# Patient Record
Sex: FEMALE | Race: BLACK OR AFRICAN AMERICAN | Employment: FULL TIME | URBAN - METROPOLITAN AREA
[De-identification: names, ages, dates, MRNs, and addresses within clinical notes are randomized per-mention and may not be internally consistent; named-entity substitution may affect disease eponyms.]

---

## 2017-03-21 ENCOUNTER — OFFICE VISIT (OUTPATIENT)
Dept: FAMILY MEDICINE CLINIC | Age: 32
End: 2017-03-21

## 2017-03-21 VITALS
BODY MASS INDEX: 29.57 KG/M2 | RESPIRATION RATE: 16 BRPM | DIASTOLIC BLOOD PRESSURE: 84 MMHG | TEMPERATURE: 97.9 F | OXYGEN SATURATION: 100 % | WEIGHT: 184 LBS | HEIGHT: 66 IN | SYSTOLIC BLOOD PRESSURE: 128 MMHG | HEART RATE: 61 BPM

## 2017-03-21 DIAGNOSIS — N97.9 INFERTILITY, FEMALE: ICD-10-CM

## 2017-03-21 DIAGNOSIS — Z13.220 SCREENING FOR CHOLESTEROL LEVEL: ICD-10-CM

## 2017-03-21 DIAGNOSIS — R21 RASH: ICD-10-CM

## 2017-03-21 DIAGNOSIS — Z80.9 FAMILY HISTORY OF CANCER: Primary | ICD-10-CM

## 2017-03-21 RX ORDER — CLOCORTOLONE PIVALATE 0 G/G
CREAM TOPICAL 3 TIMES DAILY
Qty: 30 G | Refills: 0 | Status: SHIPPED | OUTPATIENT
Start: 2017-03-21 | End: 2017-03-28 | Stop reason: CLARIF

## 2017-03-21 NOTE — PATIENT INSTRUCTIONS
Infertility: Care Instructions  Your Care Instructions  Infertility means that you have not been able to get pregnant after trying for at least 1 year. It does not mean you will never get pregnant. A woman's chances of getting pregnant are higher when she is younger. A woman is most able to get pregnant (fertile) in her late 25s. Then, in her mid-30s, she becomes less fertile. This is because her eggs get older. If you are younger than 28, you may want to give yourself more time to get pregnant. If you are 28 or older, you may want to start treatment. It can help to learn more about when you have the best chance of getting pregnant. For most women, there are five days a month when they are most likely to get pregnant. This is the time when an egg is released. This is called ovulation. Ovulation usually happens 12 to 16 days before your next period begins. You can figure out when you ovulate if you write down for a few months when you start and end your periods. Then you can count how many days are between the first day of your periods. This amount of time is called your cycle. The average cycle is 28 days. But some women have cycles that are a little shorter or longer. After you know how long your cycle is, you can predict when your next period will start. And then, you can count backward from that day to know when you will ovulate next. Your doctor may also suggest a home ovulation test. This test can tell you when you are ovulating. Infertility can be caused by a problem with the reproductive organs of a woman, a man, or both. Your doctor can help you find out what kind of problem you may have. It's important to talk about testing and treatment choices with your doctor. If you choose to do some tests, you will probably start with a hormone test. This is a test for both of you. And then the man will probably have a semen test.  Follow-up care is a key part of your treatment and safety.  Be sure to make and go to all appointments, and call your doctor if you are having problems. It's also a good idea to know your test results and keep a list of the medicines you take. How can you care for yourself at home? For women  · Take a multivitamin with folic acid. This helps to prevent birth defects if you do become pregnant. · Get regular exercise. But do not overdo it. Really hard and long exercise can cause you to release an egg less often. · Eat healthy foods. And drink lots of water. · Stay at a healthy weight. This will increase your chances of getting pregnant. Women who weigh too much or too little can be less fertile. · Talk to your doctor about all medicines you are taking or may take. This includes over-the-counter and prescribed medicines and herbal remedies. Some medicines interfere with pregnancy. · Write down when your period starts and stops for a few months. Bring that information to your doctor. He or she can help you figure out when you ovulate and are most likely to get pregnant if you have sex. Or you may prefer to use a home ovulation test.  For men  · Avoid hot tubs and saunas. · If you get sick and have a fever, try to control your fever. A high fever may reduce your sperm count for months. · If you exercise very hard most days of the week, reduce how much exercise you do. Hard, long exercise may lower your sperm count. · Eat a healthy diet and stay at a healthy weight. Limit alcohol to 2 drinks a day. For both men and women  · If the woman knows when she will ovulate, try to have sex once a day for the 4 days before ovulation and on the day of ovulation. If the man has a low sperm count, have sex every other day. · If the woman does not know when she will ovulate, have sex 2 or 3 times each week. · Don't use lubricants during sex. They may affect how well sperm can travel to meet the woman's egg. · Avoid smoking and illegal drugs. When should you call for help?   Watch closely for changes in your health, and be sure to contact your doctor if:  · You want to try other treatments for infertility. Where can you learn more? Go to http://avelino-stu.info/. Enter 042 5204 in the search box to learn more about \"Infertility: Care Instructions. \"  Current as of: May 30, 2016  Content Version: 11.1  © 5428-9731 [employer]. Care instructions adapted under license by [employer] (which disclaims liability or warranty for this information). If you have questions about a medical condition or this instruction, always ask your healthcare professional. [place] [address] any warranty or liability for your use of this information.

## 2017-03-21 NOTE — PROGRESS NOTES
Daniela Kennedy is here today as a new patient to establish care with provider. 1. Have you been to the ER, urgent care clinic since your last visit? Hospitalized since your last visit? No    2. Have you seen or consulted any other health care providers outside of the [address] [place] [address] since your last visit? Include any pap smears or colon screening.  No

## 2017-03-21 NOTE — PROGRESS NOTES
Chief Complaint   Patient presents with   [facility] Patient   [address]       Pt is a 32y.o. year old female who presents for follow up of her chronic medical problems    4 kids  Has not seen a doc in a while    Family History   Problem Relation Age of Onset    Diabetes Mother     No Known Problems Father    [address] with breast cancer in her 29's    Grandmother also  of lung cancer  Several aunts also  of different kinds of cancer  Cousin with sickle cell anemia    Cautious about her health- \"I am a health guru\"    ? Hives on her back 2 days ago-itchy, just there now  Daughter had a break out prior to that on her neck    1 year of trying to have a baby with freddy' but no success; even did the ovulation kit  Menses regular  Freddy' got tested  Miscarriage a few months ago at 2 months-she says it came out when she went to urinate      ROS:    Pt denies: Wt loss, Fever/Chills, HA, Visual changes, Fatigue, Chest pain, SOB, CANSECO, Abd pain, N/V/D/C, Blood in stool or urine, Edema. Pertinent positive as above in HPI. All others were negative    There is no problem list on file for this patient. History reviewed. No pertinent past medical history. Current Outpatient Prescriptions   Medication Sig Dispense Refill    prenatal multivit-ca-min-fe-fa tab Take  by mouth. History   Smoking Status    Current Every Day Smoker    Packs/day: 0.25    Years: 11.00   Smokeless Tobacco    Never Used       No Known Allergies    Patient Labs were reviewed: yes      Patient Past Records were reviewed:  yes        Objective:     Vitals:    17 1033   BP: 128/84   Pulse: 61   Resp: 16   Temp: 97.9 °F (36.6 °C)   TempSrc: Oral   SpO2: 100%   Weight: 184 lb (83.5 kg)   Height: 5' 6\" (1.676 m)     Body mass index is 29.7 kg/(m^2). Exam:   Appearance: alert, well appearing,  oriented to person, place, and time, acyanotic, in no respiratory distress and well hydrated.   HEENT:  NC/AT, pink conj, anicteric sclerae  Neck:  No cervical lymphadenopathy, no JVD, no thyromegaly, no carotid bruit  Heart:  RRR without M/R/G  Lungs:  CTAB, no rhonchi, rales, or wheezes with good air exchange   Abdomen:  Non-tender, pos bowel sounds, no hepatosplenomegaly  Ext:  No C/C/E    Skin: scaly rash noted on the back which appeared more like dry patches  Neuro: no lateralizing signs, CNs II-XII intact      Assessment/ Plan:   Angela Tariq was seen today for new patient and establish care. Diagnoses and all orders for this visit:    Family history of cancer-advised to gather all info regarding cancer in her family prior to appt with genetic counsellor  -     REFERRAL TO GENETICS    Infertility, female  -     REFERRAL TO GYNECOLOGY  -     METABOLIC PANEL, COMPREHENSIVE; Future  -     TSH 3RD GENERATION; Future    Rash-likely eczema, advised to moisturize the area, avoid hot showers and scented soap  -     CBC WITH AUTOMATED DIFF; Future  -     METABOLIC PANEL, COMPREHENSIVE; Future  -     clocortolone pivalate (CLODERM) 0.1 % topical cream; Apply  to affected area three (3) times daily. Screening for cholesterol level  -     LIPID PANEL; Future      Follow-up Disposition:  Return if symptoms worsen or fail to improve. I have discussed the diagnosis with the patient and the intended plan as seen in the above orders. The patient has received an After-Visit Summary and questions were answered concerning future plans. Medication Side Effects and Warnings were discussed with patient: yes    Patient verbalized understanding of above instructions.     Arianna Apodaca MD  Internal Medicine  [facility]

## 2017-03-21 NOTE — MR AVS SNAPSHOT
Visit Information Date & Time Provider Department Dept. Phone Encounter #  
 3/21/2017 10:00 AM Collette Boop, MD [facility] [id number] Upcoming Health Maintenance Date Due DTaP/Tdap/Td series (1 - Tdap) 12/8/2006 PAP AKA CERVICAL CYTOLOGY 12/8/2006 INFLUENZA AGE 9 TO ADULT 8/1/2016 Allergies as of 3/21/2017  Review Complete On: 3/21/2017 By: Collette Boop, MD  
 No Known Allergies Current Immunizations  Never Reviewed No immunizations on file. Not reviewed this visit You Were Diagnosed With   
  
 Codes Comments Family history of cancer    -  Primary ICD-10-CM: Z80.9 ICD-9-CM: V16.9 Infertility, female     ICD-10-CM: N97.9 ICD-9-CM: 628.9 Rash     ICD-10-CM: R21 
ICD-9-CM: 782.1 Screening for cholesterol level     ICD-10-CM: Z13.220 ICD-9-CM: V77.91 Vitals BP Pulse Temp Resp Height(growth percentile) Weight(growth percentile) 128/84 61 97.9 °F (36.6 °C) (Oral) 16 5' 6\" (1.676 m) 184 lb (83.5 kg) LMP SpO2 BMI OB Status Smoking Status 02/21/2017 (Within Days) 100% 29.7 kg/m2 Having regular periods Current Every Day Smoker Vitals History BMI and BSA Data Body Mass Index Body Surface Area  
 29.7 kg/m 2 1.97 m 2 Preferred Pharmacy Pharmacy Name Phone [facility], [address] [place] [address] [address] [phone] Your Updated Medication List  
  
   
This list is accurate as of: 3/21/17 11:29 AM.  Always use your most recent med list.  
  
  
  
  
 clocortolone pivalate 0.1 % topical cream  
Commonly known as:  CLODERM Apply  to affected area three (3) times daily. prenatal multivit-ca-min-fe-fa Tab Take  by mouth. Prescriptions Sent to Pharmacy Refills  
 clocortolone pivalate (CLODERM) 0.1 % topical cream 0 Sig: Apply  to affected area three (3) times daily. Class: Normal  
 Pharmacy: [facility], [address] [facility] [address]  #: [phone] Route: Topical  
  
We Performed the Following REFERRAL TO GENETICS [SME82 Custom] Comments:  
 Sister with breast cancer in her 29's, several relatives also with cancer REFERRAL TO GYNECOLOGY [REF30 Custom] Comments:  
 Please evaluate patient for infertility-Dr. Vikki Jauregui here To-Do List   
 03/21/2017 Lab:  CBC WITH AUTOMATED DIFF   
  
 03/21/2017 Lab:  LIPID PANEL   
  
 03/21/2017 Lab:  METABOLIC PANEL, COMPREHENSIVE   
  
 03/21/2017 Lab:  TSH 3RD GENERATION Referral Information Referral ID Referred By Referred To  
  
 [id number] Oddis Showers Not Available Visits Status Start Date End Date 1 New Request 3/21/17 3/21/18 If your referral has a status of pending review or denied, additional information will be sent to support the outcome of this decision. Referral ID Referred By Referred To  
 [id number] Oddis Showers Not Available Visits Status Start Date End Date 1 New Request 3/21/17 3/21/18 If your referral has a status of pending review or denied, additional information will be sent to support the outcome of this decision. Patient Instructions Infertility: Care Instructions Your Care Instructions Infertility means that you have not been able to get pregnant after trying for at least 1 year. It does not mean you will never get pregnant. A woman's chances of getting pregnant are higher when she is younger. A woman is most able to get pregnant (fertile) in her late 25s. Then, in her mid-30s, she becomes less fertile. This is because her eggs get older. If you are younger than 28, you may want to give yourself more time to get pregnant. If you are 28 or older, you may want to start treatment. It can help to learn more about when you have the best chance of getting pregnant. For most women, there are five days a month when they are most likely to get pregnant. This is the time when an egg is released. This is called ovulation. Ovulation usually happens 12 to 16 days before your next period begins. You can figure out when you ovulate if you write down for a few months when you start and end your periods. Then you can count how many days are between the first day of your periods. This amount of time is called your cycle. The average cycle is 28 days. But some women have cycles that are a little shorter or longer. After you know how long your cycle is, you can predict when your next period will start. And then, you can count backward from that day to know when you will ovulate next. Your doctor may also suggest a home ovulation test. This test can tell you when you are ovulating. Infertility can be caused by a problem with the reproductive organs of a woman, a man, or both. Your doctor can help you find out what kind of problem you may have. It's important to talk about testing and treatment choices with your doctor. If you choose to do some tests, you will probably start with a hormone test. This is a test for both of you. And then the man will probably have a semen test. 
Follow-up care is a key part of your treatment and safety. Be sure to make and go to all appointments, and call your doctor if you are having problems. It's also a good idea to know your test results and keep a list of the medicines you take. How can you care for yourself at home? For women · Take a multivitamin with folic acid. This helps to prevent birth defects if you do become pregnant. · Get regular exercise. But do not overdo it. Really hard and long exercise can cause you to release an egg less often. · Eat healthy foods. And drink lots of water. · Stay at a healthy weight.  This will increase your chances of getting pregnant. Women who weigh too much or too little can be less fertile. · Talk to your doctor about all medicines you are taking or may take. This includes over-the-counter and prescribed medicines and herbal remedies. Some medicines interfere with pregnancy. · Write down when your period starts and stops for a few months. Bring that information to your doctor. He or she can help you figure out when you ovulate and are most likely to get pregnant if you have sex. Or you may prefer to use a home ovulation test. 
For men · Avoid hot tubs and saunas. · If you get sick and have a fever, try to control your fever. A high fever may reduce your sperm count for months. · If you exercise very hard most days of the week, reduce how much exercise you do. Hard, long exercise may lower your sperm count. · Eat a healthy diet and stay at a healthy weight. Limit alcohol to 2 drinks a day. For both men and women · If the woman knows when she will ovulate, try to have sex once a day for the 4 days before ovulation and on the day of ovulation. If the man has a low sperm count, have sex every other day. · If the woman does not know when she will ovulate, have sex 2 or 3 times each week. · Don't use lubricants during sex. They may affect how well sperm can travel to meet the woman's egg. · Avoid smoking and illegal drugs. When should you call for help? Watch closely for changes in your health, and be sure to contact your doctor if: 
· You want to try other treatments for infertility. Where can you learn more? Go to http://avelino-stu.info/. Enter 542 4342 in the search box to learn more about \"Infertility: Care Instructions. \" Current as of: May 30, 2016 Content Version: 11.1 © 2607-8106 [employer]. Care instructions adapted under license by [employer] (which disclaims liability or warranty for this information).  If you have questions about a medical condition or this instruction, always ask your healthcare professional. [place] [address] any warranty or liability for your use of this information. Introducing [facility]! Alex Winters introduces Jaypore patient portal. Now you can access parts of your medical record, email your doctor's office, and request medication refills online. 1. In your internet browser, go to [email or web address] 2. Click on the First Time User? Click Here link in the Sign In box. You will see the New Member Sign Up page. 3. Enter your Jaypore Access Code exactly as it appears below. You will not need to use this code after youve completed the sign-up process. If you do not sign up before the expiration date, you must request a new code. · Jaypore Access Code: [email or web address] Expires: 6/19/2017 11:29 AM 
 
4. Enter the last four digits of your Social Security Number (xxxx) and Date of Birth (mm/dd/yyyy) as indicated and click Submit. You will be taken to the next sign-up page. 5. Create a Jaypore ID. This will be your Jaypore login ID and cannot be changed, so think of one that is secure and easy to remember. 6. Create a Jaypore password. You can change your password at any time. 7. Enter your Password Reset Question and Answer. This can be used at a later time if you forget your password. 8. Enter your e-mail address. You will receive e-mail notification when new information is available in [address] 9. Click Sign Up. You can now view and download portions of your medical record. 10. Click the Download Summary menu link to download a portable copy of your medical information. If you have questions, please visit the Frequently Asked Questions section of the Jaypore website. Remember, Jaypore is NOT to be used for urgent needs. For medical emergencies, dial 911. Now available from your iPhone and Android! Please provide this summary of care documentation to your next provider. Your primary care clinician is listed as Amy Garcia. If you have any questions after today's visit, please call [phone].

## 2017-03-22 LAB
ALBUMIN SERPL-MCNC: 4.4 G/DL (ref 3.5–5.5)
ALBUMIN/GLOB SERPL: 1.4 {RATIO} (ref 1.2–2.2)
ALP SERPL-CCNC: 49 IU/L (ref 39–117)
ALT SERPL-CCNC: 11 IU/L (ref 0–32)
AST SERPL-CCNC: 15 IU/L (ref 0–40)
BASOPHILS # BLD AUTO: 0.1 X10E3/UL (ref 0–0.2)
BASOPHILS NFR BLD AUTO: 1 %
BILIRUB SERPL-MCNC: 0.6 MG/DL (ref 0–1.2)
BUN SERPL-MCNC: 10 MG/DL (ref 6–20)
BUN/CREAT SERPL: 13 (ref 8–20)
CALCIUM SERPL-MCNC: 9.5 MG/DL (ref 8.7–10.2)
CHLORIDE SERPL-SCNC: 104 MMOL/L (ref 96–106)
CHOLEST SERPL-MCNC: 179 MG/DL (ref 100–199)
CO2 SERPL-SCNC: 21 MMOL/L (ref 18–29)
CREAT SERPL-MCNC: 0.76 MG/DL (ref 0.57–1)
EOSINOPHIL # BLD AUTO: 0.1 X10E3/UL (ref 0–0.4)
EOSINOPHIL NFR BLD AUTO: 2 %
ERYTHROCYTE [DISTWIDTH] IN BLOOD BY AUTOMATED COUNT: 14 % (ref 12.3–15.4)
GLOBULIN SER CALC-MCNC: 3.1 G/DL (ref 1.5–4.5)
GLUCOSE SERPL-MCNC: 88 MG/DL (ref 65–99)
HCT VFR BLD AUTO: 40.2 % (ref 34–46.6)
HDLC SERPL-MCNC: 75 MG/DL
HGB BLD-MCNC: 13 G/DL (ref 11.1–15.9)
IMM GRANULOCYTES # BLD: 0 X10E3/UL (ref 0–0.1)
IMM GRANULOCYTES NFR BLD: 0 %
INTERPRETATION, 910389: NORMAL
LDLC SERPL CALC-MCNC: 89 MG/DL (ref 0–99)
LYMPHOCYTES # BLD AUTO: 2.6 X10E3/UL (ref 0.7–3.1)
LYMPHOCYTES NFR BLD AUTO: 48 %
MCH RBC QN AUTO: 28.3 PG (ref 26.6–33)
MCHC RBC AUTO-ENTMCNC: 32.3 G/DL (ref 31.5–35.7)
MCV RBC AUTO: 88 FL (ref 79–97)
MONOCYTES # BLD AUTO: 0.3 X10E3/UL (ref 0.1–0.9)
MONOCYTES NFR BLD AUTO: 6 %
NEUTROPHILS # BLD AUTO: 2.3 X10E3/UL (ref 1.4–7)
NEUTROPHILS NFR BLD AUTO: 43 %
PLATELET # BLD AUTO: 315 X10E3/UL (ref 150–379)
POTASSIUM SERPL-SCNC: 4.7 MMOL/L (ref 3.5–5.2)
PROT SERPL-MCNC: 7.5 G/DL (ref 6–8.5)
RBC # BLD AUTO: 4.59 X10E6/UL (ref 3.77–5.28)
SODIUM SERPL-SCNC: 137 MMOL/L (ref 134–144)
TRIGL SERPL-MCNC: 75 MG/DL (ref 0–149)
TSH SERPL DL<=0.005 MIU/L-ACNC: 1.18 UIU/ML (ref 0.45–4.5)
VLDLC SERPL CALC-MCNC: 15 MG/DL (ref 5–40)
WBC # BLD AUTO: 5.4 X10E3/UL (ref 3.4–10.8)

## 2017-03-28 DIAGNOSIS — R21 RASH: Primary | ICD-10-CM

## 2017-03-28 RX ORDER — CLOBETASOL PROPIONATE 0.5 MG/G
CREAM TOPICAL 2 TIMES DAILY
Qty: 30 G | Refills: 0 | Status: SHIPPED | OUTPATIENT
Start: 2017-03-28 | End: 2018-04-20

## 2018-04-06 ENCOUNTER — OFFICE VISIT (OUTPATIENT)
Dept: FAMILY MEDICINE CLINIC | Age: 33
End: 2018-04-06

## 2018-04-06 VITALS
HEART RATE: 72 BPM | HEIGHT: 66 IN | DIASTOLIC BLOOD PRESSURE: 90 MMHG | OXYGEN SATURATION: 100 % | RESPIRATION RATE: 18 BRPM | BODY MASS INDEX: 27.71 KG/M2 | SYSTOLIC BLOOD PRESSURE: 137 MMHG | TEMPERATURE: 98.4 F | WEIGHT: 172.4 LBS

## 2018-04-06 DIAGNOSIS — N97.9 INFERTILITY, FEMALE: Primary | ICD-10-CM

## 2018-04-06 DIAGNOSIS — G43.109 MIGRAINE WITH AURA AND WITHOUT STATUS MIGRAINOSUS, NOT INTRACTABLE: ICD-10-CM

## 2018-04-06 DIAGNOSIS — F43.10 PTSD (POST-TRAUMATIC STRESS DISORDER): ICD-10-CM

## 2018-04-06 DIAGNOSIS — F51.01 PRIMARY INSOMNIA: ICD-10-CM

## 2018-04-06 NOTE — MR AVS SNAPSHOT
eDon Jay Lima [address]79 68 [facility] Yuri. [address] [facility] 83 [id number] 
[phone] Patient: Dom Salvador MRN: [id number] :1985 Visit Information Date & Time Provider Department Dept. Phone Encounter #  
 2018 11:00 AM Brayn Corrigan, [address] [facility] [phone] [id number] Follow-up Instructions Return if symptoms worsen or fail to improve. Upcoming Health Maintenance Date Due Pneumococcal 19-64 Medium Risk (1 of 1 - PPSV23) 2004 DTaP/Tdap/Td series (1 - Tdap) 2006 PAP AKA CERVICAL CYTOLOGY 2006 Influenza Age 5 to Adult 2017 Allergies as of 2018  Review Complete On: 2018 By: Joe Becerra LPN No Known Allergies Current Immunizations  Never Reviewed No immunizations on file. Not reviewed this visit You Were Diagnosed With   
  
 Codes Comments Infertility, female    -  Primary ICD-10-CM: N97.9 ICD-9-CM: 628.9 PTSD (post-traumatic stress disorder)     ICD-10-CM: F43.10 ICD-9-CM: 309.81 Primary insomnia     ICD-10-CM: F51.01 
ICD-9-CM: 307.42 Migraine with aura and without status migrainosus, not intractable     ICD-10-CM: G43.109 ICD-9-CM: 346.00 Vitals BP Pulse Temp Resp Height(growth percentile) Weight(growth percentile) 137/90 (BP 1 Location: Left arm, BP Patient Position: Sitting) 72 98.4 °F (36.9 °C) 18 5' 6\" (1.676 m) 172 lb 6.4 oz (78.2 kg) LMP SpO2 BMI OB Status Smoking Status 2018 (Approximate) 100% 27.83 kg/m2 Having regular periods Current Every Day Smoker Vitals History BMI and BSA Data Body Mass Index Body Surface Area  
 27.83 kg/m 2 1.91 m 2 Preferred Pharmacy Pharmacy Name Phone [facility], [address] [place] [address] [address] [facility] [phone] Your Updated Medication List  
  
   
 This list is accurate as of 4/6/18 12:00 PM.  Always use your most recent med list.  
  
  
  
  
 clobetasol 0.05 % topical cream  
Commonly known as:  Alonna Gear Apply  to affected area two (2) times a day. CREATINE PO Take  by mouth. MOTRIN PM PO Take  by mouth.  
  
 prenatal multivit-ca-min-fe-fa Tab Take  by mouth. We Performed the Following REFERRAL TO INFERTILITY [BXX20 Custom] Comments:  
 Been trying for over a year, miscarriage 2 years ago,  test he is fine REFERRAL TO NEUROLOGY [QDD09 Custom] Comments:  
 Migraine headaches Follow-up Instructions Return if symptoms worsen or fail to improve. Referral Information Referral ID Referred By Referred To  
  
 [id number] Jyoti GROVE 99 Not Available Visits Status Start Date End Date 1 New Request 4/6/18 4/6/19 If your referral has a status of pending review or denied, additional information will be sent to support the outcome of this decision. Referral ID Referred By Referred To  
 [id number] Jyoti GROVE 99 Not Available Visits Status Start Date End Date 1 New Request 4/6/18 4/6/19 If your referral has a status of pending review or denied, additional information will be sent to support the outcome of this decision. Patient Instructions Infertility: Care Instructions Your Care Instructions Infertility means that you have not been able to get pregnant after trying for at least 1 year. It does not mean you will never get pregnant. A woman's chances of getting pregnant are higher when she is younger. A woman is most able to get pregnant (fertile) in her late 25s. Then, in her mid-30s, she becomes less fertile. This is because her eggs get older. If you are younger than 28, you may want to give yourself more time to get pregnant. If you are 28 or older, you may want to start treatment. It can help to learn more about when you have the best chance of getting pregnant. For most women, there are five days a month when they are most likely to get pregnant. This is the time when an egg is released. This is called ovulation. Ovulation usually happens 12 to 16 days before your next period begins. You can figure out when you ovulate if you write down for a few months when you start and end your periods. Then you can count how many days are between the first day of your periods. This amount of time is called your cycle. The average cycle is 28 days. But some women have cycles that are a little shorter or longer. After you know how long your cycle is, you can predict when your next period will start. And then, you can count backward from that day to know when you will ovulate next. Your doctor may also suggest a home ovulation test. This test can tell you when you are ovulating. Infertility can be caused by a problem with the reproductive organs of a woman, a man, or both. Your doctor can help you find out what kind of problem you may have. It's important to talk about testing and treatment choices with your doctor. If you choose to do some tests, you will probably start with a hormone test. This is a test for both of you. And then the man will probably have a semen test. 
Follow-up care is a key part of your treatment and safety. Be sure to make and go to all appointments, and call your doctor if you are having problems. It's also a good idea to know your test results and keep a list of the medicines you take. How can you care for yourself at home? For women · Take a multivitamin with folic acid. This helps to prevent birth defects if you do become pregnant. · Get regular exercise. But do not overdo it. Really hard and long exercise can cause you to release an egg less often. · Eat healthy foods. And drink lots of water. · Stay at a healthy weight.  This will increase your chances of getting pregnant. Women who weigh too much or too little can be less fertile. · Talk to your doctor about all medicines you are taking or may take. This includes over-the-counter and prescribed medicines and herbal remedies. Some medicines interfere with pregnancy. · Write down when your period starts and stops for a few months. Bring that information to your doctor. He or she can help you figure out when you ovulate and are most likely to get pregnant if you have sex. Or you may prefer to use a home ovulation test. 
For men · Avoid hot tubs and saunas. · If you get sick and have a fever, try to control your fever. A high fever may reduce your sperm count for months. · If you exercise very hard most days of the week, reduce how much exercise you do. Hard, long exercise may lower your sperm count. · Eat a healthy diet and stay at a healthy weight. Limit alcohol to 2 drinks a day. For both men and women · If the woman knows when she will ovulate, try to have sex once a day for the 4 days before ovulation and on the day of ovulation. If the man has a low sperm count, have sex every other day. · If the woman does not know when she will ovulate, have sex 2 or 3 times each week. · Don't use lubricants during sex. They may affect how well sperm can travel to meet the woman's egg. · Avoid smoking and illegal drugs. When should you call for help? Watch closely for changes in your health, and be sure to contact your doctor if you have any problems. Where can you learn more? Go to http://avelino-stu.info/. Enter 764 [id number] in the search box to learn more about \"Infertility: Care Instructions. \" Current as of: March 16, 2017 Content Version: 11.4 © 8721-1382 [employer]Data. Care instructions adapted under license by [employer] (which disclaims liability or warranty for this information).  If you have questions about a medical condition or this instruction, always ask your healthcare professional. [address] any warranty or liability for your use of this information. Insomnia: Care Instructions Your Care Instructions Insomnia is the inability to sleep well. It is a common problem for most people at some time. Insomnia may make it hard for you to get to sleep, stay asleep, or sleep as long as you need to. This can make you tired and grouchy during the day. It can also make you forgetful, less effective at work, and unhappy. Insomnia can be caused by conditions such as depression or anxiety. Pain can also affect your ability to sleep. When these problems are solved, the insomnia usually clears up. But sometimes bad sleep habits can cause insomnia. If insomnia is affecting your work or your enjoyment of life, you can take steps to improve your sleep. Follow-up care is a key part of your treatment and safety. Be sure to make and go to all appointments, and call your doctor if you are having problems. It's also a good idea to know your test results and keep a list of the medicines you take. How can you care for yourself at home? What to avoid · Do not have drinks with caffeine, such as coffee or black tea, for 8 hours before bed. · Do not smoke or use other types of tobacco near bedtime. Nicotine is a stimulant and can keep you awake. · Avoid drinking alcohol late in the evening, because it can cause you to wake in the middle of the night. · Do not eat a big meal close to bedtime. If you are hungry, eat a light snack. · Do not drink a lot of water close to bedtime, because the need to urinate may wake you up during the night. · Do not read or watch TV in bed. Use the bed only for sleeping and sexual activity. What to try · Go to bed at the same time every night, and wake up at the same time every morning. Do not take naps during the day. · Keep your bedroom quiet, dark, and cool. · Sleep on a comfortable pillow and mattress. · If watching the clock makes you anxious, turn it facing away from you so you cannot see the time. · If you worry when you lie down, start a worry book. Well before bedtime, write down your worries, and then set the book and your concerns aside. · Try meditation or other relaxation techniques before you go to bed. · If you cannot fall asleep, get up and go to another room until you feel sleepy. Do something relaxing. Repeat your bedtime routine before you go to bed again. · Make your house quiet and calm about an hour before bedtime. Turn down the lights, turn off the TV, log off the computer, and turn down the volume on music. This can help you relax after a busy day. When should you call for help? Watch closely for changes in your health, and be sure to contact your doctor if: 
? · Your efforts to improve your sleep do not work. ? · Your insomnia gets worse. ? · You have been feeling down, depressed, or hopeless or have lost interest in things that you usually enjoy. Where can you learn more? Go to http://avelinoWine in Blackstu.info/. Enter P513 in the search box to learn more about \"Insomnia: Care Instructions. \" Current as of: July 26, 2016 Content Version: 11.4 © 3632-9590 [employer]. Care instructions adapted under license by [employer] (which disclaims liability or warranty for this information). If you have questions about a medical condition or this instruction, always ask your healthcare professional. [place] [address] any warranty or liability for your use of this information. Deciding About Taking Medicine to Prevent Migraines Deciding About Taking Medicine to Prevent Migraines What are migraines? Migraines are painful, throbbing headaches. They can last from 4 to 72 hours. They often occur on only one side of your head.  But you may feel them on both sides. The pain may keep you from doing your daily activities. You may take a daily medicine if you get bad migraines often. This can help prevent them. What are key points about this decision? · Medicines to prevent migraines may not stop them every time. But if you take them daily, you can reduce how many migraines you get by more than half. They can also reduce how long migraines last. And your symptoms may not be as bad. · Medicines that prevent migraines may cause side effects. You may have sleep and memory problems, upset stomach, dry mouth, or constipation. Some of these side effects may last for as long as you take the medicine. Or they may go away within a few weeks. Why might you choose to take medicine to prevent migraines? · You are willing to take medicine daily if it will help your symptoms. · You don't think the side effects of the medicine could be as bad as your migraine symptoms. · Your migraines get in the way of your work. Or they harm your relationships with friends and family. · Benefits of medicine include fewer or no migraines. And your migraines may not last as long or feel as bad. Why might you choose not to take medicine to prevent migraines? · You want to avoid the side effects of the medicine. · You don't want to take medicine every day. · Your migraines are not affecting your work and relationships. · If your symptoms don't improve with home treatment and other medicines, you can decide later to take medicine every day to help prevent migraines. Your decision Thinking about the facts and your feelings can help you make a decision that is right for you. Be sure you understand the benefits and risks of your options, and think about what else you need to do before you make the decision. Where can you learn more? Go to http://avelino-stu.info/.  
Enter K893 in the search box to learn more about \"Deciding About Taking Medicine to Prevent Migraines. \" Current as of: October 14, 2016 Content Version: 11.4 © 4927-0250 [employer]ParcelDelivery. Care instructions adapted under license by [employer] (which disclaims liability or warranty for this information). If you have questions about a medical condition or this instruction, always ask your healthcare professional. [address]  any warranty or liability for your use of this information. Introducing [facility]! [facility] introduces KneoWorld patient portal. Now you can access parts of your medical record, email your doctor's office, and request medication refills online. 1. In your internet browser, go to [email or web address] 2. Click on the First Time User? Click Here link in the Sign In box. You will see the New Member Sign Up page. 3. Enter your KneoWorld Access Code exactly as it appears below. You will not need to use this code after youve completed the sign-up process. If you do not sign up before the expiration date, you must request a new code. · KneoWorld Access Code: [email or web address] Expires: 4/22/2018  2:34 PM 
 
4. Enter the last four digits of your Social Security Number (xxxx) and Date of Birth (mm/dd/yyyy) as indicated and click Submit. You will be taken to the next sign-up page. 5. Create a KneoWorld ID. This will be your KneoWorld login ID and cannot be changed, so think of one that is secure and easy to remember. 6. Create a KneoWorld password. You can change your password at any time. 7. Enter your Password Reset Question and Answer. This can be used at a later time if you forget your password. 8. Enter your e-mail address. You will receive e-mail notification when new information is available in [address] 9. Click Sign Up. You can now view and download portions of your medical record. 10. Click the Download Summary menu link to download a portable copy of your medical information. If you have questions, please visit the Frequently Asked Questions section of the [employer] website. Remember, [employer] is NOT to be used for urgent needs. For medical emergencies, dial 911. Now available from your iPhone and Android! Please provide this summary of care documentation to your next provider. Your primary care clinician is listed as Trung Ledbetter. If you have any questions after today's visit, please call [phone].
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
## 2018-04-06 NOTE — PROGRESS NOTES
Chief Complaint   Patient presents with    Infertility     1. Have you been to the ER, urgent care clinic since your last visit? Hospitalized since your last visit? No    2. Have you seen or consulted any other health care providers outside of the [address] [place] [address] since your last visit? Include any pap smears or colon screening.  No

## 2018-04-06 NOTE — PATIENT INSTRUCTIONS
Infertility: Care Instructions  Your Care Instructions    Infertility means that you have not been able to get pregnant after trying for at least 1 year. It does not mean you will never get pregnant. A woman's chances of getting pregnant are higher when she is younger. A woman is most able to get pregnant (fertile) in her late 25s. Then, in her mid-30s, she becomes less fertile. This is because her eggs get older. If you are younger than 28, you may want to give yourself more time to get pregnant. If you are 28 or older, you may want to start treatment. It can help to learn more about when you have the best chance of getting pregnant. For most women, there are five days a month when they are most likely to get pregnant. This is the time when an egg is released. This is called ovulation. Ovulation usually happens 12 to 16 days before your next period begins. You can figure out when you ovulate if you write down for a few months when you start and end your periods. Then you can count how many days are between the first day of your periods. This amount of time is called your cycle. The average cycle is 28 days. But some women have cycles that are a little shorter or longer. After you know how long your cycle is, you can predict when your next period will start. And then, you can count backward from that day to know when you will ovulate next. Your doctor may also suggest a home ovulation test. This test can tell you when you are ovulating. Infertility can be caused by a problem with the reproductive organs of a woman, a man, or both. Your doctor can help you find out what kind of problem you may have. It's important to talk about testing and treatment choices with your doctor. If you choose to do some tests, you will probably start with a hormone test. This is a test for both of you. And then the man will probably have a semen test.  Follow-up care is a key part of your treatment and safety.  Be sure to make and go to all appointments, and call your doctor if you are having problems. It's also a good idea to know your test results and keep a list of the medicines you take. How can you care for yourself at home? For women  · Take a multivitamin with folic acid. This helps to prevent birth defects if you do become pregnant. · Get regular exercise. But do not overdo it. Really hard and long exercise can cause you to release an egg less often. · Eat healthy foods. And drink lots of water. · Stay at a healthy weight. This will increase your chances of getting pregnant. Women who weigh too much or too little can be less fertile. · Talk to your doctor about all medicines you are taking or may take. This includes over-the-counter and prescribed medicines and herbal remedies. Some medicines interfere with pregnancy. · Write down when your period starts and stops for a few months. Bring that information to your doctor. He or she can help you figure out when you ovulate and are most likely to get pregnant if you have sex. Or you may prefer to use a home ovulation test.  For men  · Avoid hot tubs and saunas. · If you get sick and have a fever, try to control your fever. A high fever may reduce your sperm count for months. · If you exercise very hard most days of the week, reduce how much exercise you do. Hard, long exercise may lower your sperm count. · Eat a healthy diet and stay at a healthy weight. Limit alcohol to 2 drinks a day. For both men and women  · If the woman knows when she will ovulate, try to have sex once a day for the 4 days before ovulation and on the day of ovulation. If the man has a low sperm count, have sex every other day. · If the woman does not know when she will ovulate, have sex 2 or 3 times each week. · Don't use lubricants during sex. They may affect how well sperm can travel to meet the woman's egg. · Avoid smoking and illegal drugs. When should you call for help?   Watch closely for changes in your health, and be sure to contact your doctor if you have any problems. Where can you learn more? Go to http://avelino-stu.info/. Enter 009 5189 in the search box to learn more about \"Infertility: Care Instructions. \"  Current as of: March 16, 2017  Content Version: 11.4  © 1881-0904 [employer]. Care instructions adapted under license by [employer] (which disclaims liability or warranty for this information). If you have questions about a medical condition or this instruction, always ask your healthcare professional. [address] any warranty or liability for your use of this information. Insomnia: Care Instructions  Your Care Instructions    Insomnia is the inability to sleep well. It is a common problem for most people at some time. Insomnia may make it hard for you to get to sleep, stay asleep, or sleep as long as you need to. This can make you tired and grouchy during the day. It can also make you forgetful, less effective at work, and unhappy. Insomnia can be caused by conditions such as depression or anxiety. Pain can also affect your ability to sleep. When these problems are solved, the insomnia usually clears up. But sometimes bad sleep habits can cause insomnia. If insomnia is affecting your work or your enjoyment of life, you can take steps to improve your sleep. Follow-up care is a key part of your treatment and safety. Be sure to make and go to all appointments, and call your doctor if you are having problems. It's also a good idea to know your test results and keep a list of the medicines you take. How can you care for yourself at home? What to avoid  · Do not have drinks with caffeine, such as coffee or black tea, for 8 hours before bed. · Do not smoke or use other types of tobacco near bedtime. Nicotine is a stimulant and can keep you awake.   · Avoid drinking alcohol late in the evening, because it can cause you to wake in the middle of the night. · Do not eat a big meal close to bedtime. If you are hungry, eat a light snack. · Do not drink a lot of water close to bedtime, because the need to urinate may wake you up during the night. · Do not read or watch TV in bed. Use the bed only for sleeping and sexual activity. What to try  · Go to bed at the same time every night, and wake up at the same time every morning. Do not take naps during the day. · Keep your bedroom quiet, dark, and cool. · Sleep on a comfortable pillow and mattress. · If watching the clock makes you anxious, turn it facing away from you so you cannot see the time. · If you worry when you lie down, start a worry book. Well before bedtime, write down your worries, and then set the book and your concerns aside. · Try meditation or other relaxation techniques before you go to bed. · If you cannot fall asleep, get up and go to another room until you feel sleepy. Do something relaxing. Repeat your bedtime routine before you go to bed again. · Make your house quiet and calm about an hour before bedtime. Turn down the lights, turn off the TV, log off the computer, and turn down the volume on music. This can help you relax after a busy day. When should you call for help? Watch closely for changes in your health, and be sure to contact your doctor if:  ? · Your efforts to improve your sleep do not work. ? · Your insomnia gets worse. ? · You have been feeling down, depressed, or hopeless or have lost interest in things that you usually enjoy. Where can you learn more? Go to http://avelino-stu.info/. Enter P513 in the search box to learn more about \"Insomnia: Care Instructions. \"  Current as of: July 26, 2016  Content Version: 11.4  © 2081-1953 [employer]. Care instructions adapted under license by [employer] (which disclaims liability or warranty for this information).  If you have questions about a medical condition or this instruction, always ask your healthcare professional. [address] any warranty or liability for your use of this information. Deciding About Taking Medicine to Prevent Migraines  Deciding About Taking Medicine to Prevent Migraines  What are migraines? Migraines are painful, throbbing headaches. They can last from 4 to 72 hours. They often occur on only one side of your head. But you may feel them on both sides. The pain may keep you from doing your daily activities. You may take a daily medicine if you get bad migraines often. This can help prevent them. What are key points about this decision? · Medicines to prevent migraines may not stop them every time. But if you take them daily, you can reduce how many migraines you get by more than half. They can also reduce how long migraines last. And your symptoms may not be as bad. · Medicines that prevent migraines may cause side effects. You may have sleep and memory problems, upset stomach, dry mouth, or constipation. Some of these side effects may last for as long as you take the medicine. Or they may go away within a few weeks. Why might you choose to take medicine to prevent migraines? · You are willing to take medicine daily if it will help your symptoms. · You don't think the side effects of the medicine could be as bad as your migraine symptoms. · Your migraines get in the way of your work. Or they harm your relationships with friends and family. · Benefits of medicine include fewer or no migraines. And your migraines may not last as long or feel as bad. Why might you choose not to take medicine to prevent migraines? · You want to avoid the side effects of the medicine. · You don't want to take medicine every day. · Your migraines are not affecting your work and relationships.   · If your symptoms don't improve with home treatment and other medicines, you can decide later to take medicine every day to help prevent migraines. Your decision  Thinking about the facts and your feelings can help you make a decision that is right for you. Be sure you understand the benefits and risks of your options, and think about what else you need to do before you make the decision. Where can you learn more? Go to http://avelino-stu.info/. Enter Y286 in the search box to learn more about \"Deciding About Taking Medicine to Prevent Migraines. \"  Current as of: October 14, 2016  Content Version: 11.4  © 9207-9282 [employer]. Care instructions adapted under license by [employer] (which disclaims liability or warranty for this information). If you have questions about a medical condition or this instruction, always ask your healthcare professional. [address] any warranty or liability for your use of this information.

## 2018-04-10 PROBLEM — F51.01 PRIMARY INSOMNIA: Status: ACTIVE | Noted: 2018-04-10

## 2018-04-10 PROBLEM — G43.109 MIGRAINE WITH AURA AND WITHOUT STATUS MIGRAINOSUS, NOT INTRACTABLE: Status: ACTIVE | Noted: 2018-04-10

## 2018-04-10 PROBLEM — F43.10 PTSD (POST-TRAUMATIC STRESS DISORDER): Status: ACTIVE | Noted: 2018-04-10

## 2018-04-10 PROBLEM — N97.9 INFERTILITY, FEMALE: Status: ACTIVE | Noted: 2018-04-10

## 2018-04-20 ENCOUNTER — OFFICE VISIT (OUTPATIENT)
Dept: FAMILY MEDICINE CLINIC | Age: 33
End: 2018-04-20

## 2018-04-20 VITALS
SYSTOLIC BLOOD PRESSURE: 121 MMHG | WEIGHT: 175 LBS | OXYGEN SATURATION: 100 % | RESPIRATION RATE: 18 BRPM | HEIGHT: 66 IN | TEMPERATURE: 98.6 F | BODY MASS INDEX: 28.12 KG/M2 | HEART RATE: 69 BPM | DIASTOLIC BLOOD PRESSURE: 84 MMHG

## 2018-04-20 DIAGNOSIS — R10.2 PELVIC PAIN: Primary | ICD-10-CM

## 2018-04-20 DIAGNOSIS — Z12.4 SCREENING FOR CERVICAL CANCER: ICD-10-CM

## 2018-04-20 DIAGNOSIS — R21 RASH AND NONSPECIFIC SKIN ERUPTION: ICD-10-CM

## 2018-04-20 RX ORDER — BETAMETHASONE DIPROPIONATE 0.5 MG/G
CREAM TOPICAL 2 TIMES DAILY
Qty: 15 G | Refills: 0 | Status: SHIPPED | OUTPATIENT
Start: 2018-04-20

## 2018-04-20 RX ORDER — IBUPROFEN 800 MG/1
TABLET ORAL
COMMUNITY

## 2018-04-20 NOTE — PROGRESS NOTES
Subjective:   Barrett Corado is a 28 y.o. female here for an acute visit for    Chief Complaint   Patient presents with   [facility] Follow Up     ED follow up ANDI RODRIGUEZ [facility] [place]    Rash     left wrist       HPI  Went to the ED 4/14 for pelvic pain. On that visit labs were drawn, and internal vaginal exam was performed, she was tested for pregnancy and STD's. She was positive for clue cells, all other tests were negative. Pt. States MD said \"looks red\", asked \"how does that feel\" She states it hurt but does not know where the MD touched her. Onset Two weeks ago   Location Pelvic pain   Duration intermittent   Characteristics excruciating contractions   Aggrevating Walking and moving, has pain after intercourse   Relieving nothing   Treatment Motrin,    Pertinent PMH none   Pertinent negatives Fever, LMP 4/3/18, regular schedule, heavy bleed, no bleeding with episodes of pain, no painful urination, intercourse not painful, no bleeding after intercourse, N/V/C/D     Rash  C/o rash to left wrist, states been there about a week, was larger, has tried OTC hydrocortisone cream with no relief, does not recall any recent contact with unusual substances    ROS  As above, the rest are negative    Current Outpatient Prescriptions   Medication Sig Dispense Refill    ibuprofen (MOTRIN) 800 mg tablet Take  by mouth.  betamethasone dipropionate (DIPROSONE) 0.05 % topical cream Apply  to affected area two (2) times a day. 15 g 0    CREATINE MONOHYDRATE (CREATINE PO) Take  by mouth.  IBUPROFEN/DIPHENHYDRAMINE CIT (MOTRIN PM PO) Take  by mouth.  prenatal multivit-ca-min-fe-fa tab Take  by mouth.             Patient Active Problem List   Diagnosis Code    Infertility, female N80.10    PTSD (post-traumatic stress disorder) F43.10    Primary insomnia F51.01    Migraine with aura and without status migrainosus, not intractable G43.109       No Known Allergies  Family History   Problem Relation Age of Onset    Diabetes Mother    [facility] No Known Problems Father     Cancer Sister        Objective:     Vitals:    04/20/18 1058   BP: 121/84   Pulse: 69   Resp: 18   Temp: 98.6 °F (37 °C)   TempSrc: Oral   SpO2: 100%   Weight: 175 lb (79.4 kg)   Height: 5' 6\" (1.676 m)     Body mass index is 28.25 kg/(m^2). Appearance: alert, well appearing, and in no distress. ENT normal.  Neck supple. No adenopathy or thyromegaly. PERRLA. Abdomen soft without tenderness, guarding, mass or organomegaly. No bruit. Neurological is normal, no focal findings. Internal vaginal exam performed, no abnormalities noted. Intracervical and cervical cells collected for PAP smear as she cannot recall her last test. Bimanual palpation illicited tenderness to LLQ pelvis but no cervical motion tenderness. Labwork and Ancillary Studies:    CBC w/Diff  Lab Results   Component Value Date/Time    WBC 5.4 03/21/2017 11:30 AM    HGB 13.0 03/21/2017 11:30 AM    PLATELET 861 22/51/4607 11:30 AM         Basic Metabolic Profile  Lab Results   Component Value Date/Time    Sodium 137 03/21/2017 11:30 AM    Potassium 4.7 03/21/2017 11:30 AM    Chloride 104 03/21/2017 11:30 AM    CO2 21 03/21/2017 11:30 AM    Glucose 88 03/21/2017 11:30 AM    BUN 10 03/21/2017 11:30 AM    Creatinine 0.76 03/21/2017 11:30 AM    BUN/Creatinine ratio 13 03/21/2017 11:30 AM    GFR est  03/21/2017 11:30 AM    GFR est non- 03/21/2017 11:30 AM    Calcium 9.5 03/21/2017 11:30 AM        Cholesterol  Lab Results   Component Value Date/Time    Cholesterol, total 179 03/21/2017 11:30 AM    HDL Cholesterol 75 03/21/2017 11:30 AM    LDL, calculated 89 03/21/2017 11:30 AM    Triglyceride 75 03/21/2017 11:30 AM          Assessment/Plan:    Diagnoses and all orders for this visit:    1. Pelvic pain  -     US TRANSVAGINAL; Future  -     CBC W/O DIFF; Future    2.  Rash and nonspecific skin eruption  -     betamethasone dipropionate (DIPROSONE) 0.05 % topical cream; Apply  to affected area two (2) times a day.    3. Screening for cervical cancer  -     PAP, LB, RFX HPV FADDJ([id number]); Future    Pelvic exam performed, unable to visualize any redness or abnormalities. Specimens collected for PAP. Upon palpation with bi-manual exam exhibited tenderness to L side of pelvis. No masses felt. No cervical motion tenderness. Will send for TV US, check CBC for infections. Return to clinic if sxs persist, to ER if sxs worsen    Patient verbalized understanding to above instructions. AVS printed and given to pt. Follow-up Disposition:  Return if symptoms worsen or fail to improve, for make appoitment with Lela Deng. Gladys Medina, [facility]  [address] [facility]   [address] [facility] [address]  [id number]

## 2018-04-20 NOTE — MR AVS SNAPSHOT
Deon Jay [address]ima [address] [facility] [address] [facility] [address] [address] 
[phone] Patient: Phan Maldonado MRN: [id number] :1985 Visit Information Date & Time Provider Department Dept. Phone Encounter #  
 2018 10:15 AM [facility]ellefern , [address] [place] [address] [phone] [id number] Follow-up Instructions Return if symptoms worsen or fail to improve, for make appoitment with Orquidea Ardon. Upcoming Health Maintenance Date Due Pneumococcal 19-64 Medium Risk (1 of 1 - PPSV23) 2004 DTaP/Tdap/Td series (1 - Tdap) 2006 PAP AKA CERVICAL CYTOLOGY 2006 Influenza Age 5 to Adult 2017 Allergies as of 2018  Review Complete On: 2018 By: Odell Juárez LPN No Known Allergies Current Immunizations  Never Reviewed No immunizations on file. Not reviewed this visit You Were Diagnosed With   
  
 Codes Comments Pelvic pain    -  Primary ICD-10-CM: R10.2 ICD-9-CM: RTS9358 Rash and nonspecific skin eruption     ICD-10-CM: R21 
ICD-9-CM: 782.1 Vitals BP Pulse Temp Resp Height(growth percentile) Weight(growth percentile) 121/84 69 98.6 °F (37 °C) (Oral) 18 5' 6\" (1.676 m) 175 lb (79.4 kg) LMP SpO2 BMI OB Status Smoking Status 2018 100% 28.25 kg/m2 Having regular periods Light Tobacco Smoker Vitals History BMI and BSA Data Body Mass Index Body Surface Area  
 28.25 kg/m 2 1.92 m 2 Preferred Pharmacy Pharmacy Name Phone [facility], [address] [place] [address] [address] [phone] Your Updated Medication List  
  
   
This list is accurate as of 18 11:45 AM.  Always use your most recent med list.  
  
  
  
  
 betamethasone dipropionate 0.05 % topical cream  
Commonly known as:  Ruddy Jerica Apply  to affected area two (2) times a day.   
  
 CREATINE PO  
 Take  by mouth. ibuprofen 800 mg tablet Commonly known as:  MOTRIN Take  by mouth. MOTRIN PM PO Take  by mouth.  
  
 prenatal multivit-ca-min-fe-fa Tab Take  by mouth. Prescriptions Printed Refills  
 betamethasone dipropionate (DIPROSONE) 0.05 % topical cream 0 Sig: Apply  to affected area two (2) times a day. Class: Print Route: Topical  
  
Follow-up Instructions Return if symptoms worsen or fail to improve, for make appoitment with Bard Zepeda. To-Do List   
 04/20/2018 Lab:  CBC W/O DIFF   
  
 04/20/2018 Imaging:  US TRANSVAGINAL Introducing [facility]! [place] [employer] introduces [facility] patient portal. Now you can access parts of your medical record, email your doctor's office, and request medication refills online. 1. In your internet browser, go to [email or web address] 2. Click on the First Time User? Click Here link in the Sign In box. You will see the New Member Sign Up page. 3. Enter your [facility] Access Code exactly as it appears below. You will not need to use this code after youve completed the sign-up process. If you do not sign up before the expiration date, you must request a new code. · [facility] Access Code: [email or web address] Expires: 4/22/2018  2:34 PM 
 
4. Enter the last four digits of your Social Security Number (xxxx) and Date of Birth (mm/dd/yyyy) as indicated and click Submit. You will be taken to the next sign-up page. 5. Create a TiGen[employer] ID. This will be your [facility] login ID and cannot be changed, so think of one that is secure and easy to remember. 6. Create a [facility] password. You can change your password at any time. 7. Enter your Password Reset Question and Answer. This can be used at a later time if you forget your password. 8. Enter your e-mail address. You will receive e-mail notification when new information is available in [address] 9. Click Sign Up. You can now view and download portions of your medical record. 10. Click the Download Summary menu link to download a portable copy of your medical information. If you have questions, please visit the Frequently Asked Questions section of the [employer] website. Remember, [employer] is NOT to be used for urgent needs. For medical emergencies, dial 911. Now available from your iPhone and Android! Please provide this summary of care documentation to your next provider. Your primary care clinician is listed as Alfonzo Ponce. If you have any questions after today's visit, please call [phone].

## 2018-04-21 LAB
ERYTHROCYTE [DISTWIDTH] IN BLOOD BY AUTOMATED COUNT: 14 % (ref 12.3–15.4)
HCT VFR BLD AUTO: 35.4 % (ref 34–46.6)
HGB BLD-MCNC: 11.8 G/DL (ref 11.1–15.9)
MCH RBC QN AUTO: 29.2 PG (ref 26.6–33)
MCHC RBC AUTO-ENTMCNC: 33.3 G/DL (ref 31.5–35.7)
MCV RBC AUTO: 88 FL (ref 79–97)
PLATELET # BLD AUTO: 286 X10E3/UL (ref 150–379)
RBC # BLD AUTO: 4.04 X10E6/UL (ref 3.77–5.28)
WBC # BLD AUTO: 5.3 X10E3/UL (ref 3.4–10.8)

## 2018-04-24 LAB
CYTOLOGIST CVX/VAG CYTO: NORMAL
DX ICD CODE: NORMAL
LABCORP, 190119: NORMAL
Lab: NORMAL
Lab: NORMAL
OTHER STN SPEC: NORMAL
PATH REPORT.FINAL DX SPEC: NORMAL
STAT OF ADQ CVX/VAG CYTO-IMP: NORMAL

## 2021-05-10 ENCOUNTER — VIRTUAL VISIT (OUTPATIENT)
Dept: FAMILY MEDICINE CLINIC | Age: 36
End: 2021-05-10
Payer: MEDICAID

## 2021-05-10 DIAGNOSIS — N64.52 NIPPLE DISCHARGE: ICD-10-CM

## 2021-05-10 DIAGNOSIS — N64.4 BREAST PAIN, RIGHT: ICD-10-CM

## 2021-05-10 DIAGNOSIS — D50.0 IRON DEFICIENCY ANEMIA DUE TO CHRONIC BLOOD LOSS: ICD-10-CM

## 2021-05-10 DIAGNOSIS — K27.9 PEPTIC ULCER DISEASE: Primary | ICD-10-CM

## 2021-05-10 DIAGNOSIS — K92.1 HEMATOCHEZIA: ICD-10-CM

## 2021-05-10 PROBLEM — G43.109 MIGRAINE WITH AURA AND WITHOUT STATUS MIGRAINOSUS, NOT INTRACTABLE: Status: RESOLVED | Noted: 2018-04-10 | Resolved: 2021-05-10

## 2021-05-10 PROCEDURE — 99204 OFFICE O/P NEW MOD 45 MIN: CPT | Performed by: INTERNAL MEDICINE

## 2021-05-10 RX ORDER — OMEPRAZOLE 40 MG/1
40 CAPSULE, DELAYED RELEASE ORAL DAILY
Qty: 90 CAP | Refills: 1 | Status: SHIPPED | OUTPATIENT
Start: 2021-05-10

## 2021-05-10 NOTE — PROGRESS NOTES
Reilly Guaman is a 28 y.o. female who was seen by synchronous (real-time) audio-video technology using doxy. me on 5/10/2021. Location of the patient: Home    Location of the provider: [facility]    Consent:  She and/or health care decision maker is aware that that she may receive a bill for this telehealth service, depending on her insurance coverage, and has provided verbal consent to proceed: Yes    Subjective:   Reilly Guaman is a 28 y.o. female who presents today for management of    Chief Complaint   Patient presents with   [address]       Patient is here to establish care. Previous PCP: Garfield Hussein    Patient reports of epigastric pain. Onset was 3 months ago. She underwent EGD on 2/23/2021 which showed:       - Normal esophagus.       - Non-bleeding gastric ulcer with no stigmata of bleeding. Biopsied.       - Specks of old blood in the gastric body. - Normal examined duodenum. She also reports of black stools. She also has blood-streaked stools. Patient ran out of PPI 3 months ago. She has not followed up with GI. Right Breast pain  Patient presents for evaluation of a probable skin infection located on the right breast.   Onset of symptoms was 5 months ago, with gradually worsening since that time. Symptoms include tenderness and pain. It is associated with whitish nipple discharge. Patient denies  fever, chills, nausea and vomiting. There is not a history trauma to the area. Treatment to date has included none. Past Medical History  Past Medical History:   Diagnosis Date    Migraine with aura and without status migrainosus, not intractable 4/10/2018    PTSD (post-traumatic stress disorder)     Stomach ulcer        Surgical History  History reviewed. No pertinent surgical history.      Problem List  Patient Active Problem List    Diagnosis Date Noted    Iron deficiency anemia due to chronic blood loss 05/10/2021    Peptic ulcer disease 05/10/2021    Hematochezia 05/10/2021    Breast pain, right 05/10/2021    Nipple discharge 05/10/2021    PTSD (post-traumatic stress disorder) 04/10/2018    Primary insomnia 04/10/2018       Current Medications  Current Outpatient Medications   Medication Sig    omeprazole (PRILOSEC) 40 mg capsule Take 1 Cap by mouth daily. 30 minutes before breakfast    ibuprofen (MOTRIN) 800 mg tablet Take  by mouth.  betamethasone dipropionate (DIPROSONE) 0.05 % topical cream Apply  to affected area two (2) times a day.  CREATINE MONOHYDRATE (CREATINE PO) Take  by mouth.  IBUPROFEN/DIPHENHYDRAMINE CIT (MOTRIN PM PO) Take  by mouth.  prenatal multivit-ca-min-fe-fa tab Take  by mouth. No current facility-administered medications for this visit. Allergies/Drug Reactions  Allergies   Allergen Reactions    Pork/Porcine Containing Products Hives        Social History  Social History     Tobacco Use    Smoking status: Light Tobacco Smoker     Packs/day: 0.25     Years: 2.00     Pack years: 0.50    Smokeless tobacco: Never Used   Substance Use Topics    Alcohol use: No    Drug use: No        Review of Systems  Review of Systems   Constitutional: Negative. Respiratory: Negative. Cardiovascular: Negative. Gastrointestinal: Positive for abdominal pain, blood in stool and melena. Negative for constipation, diarrhea, nausea and vomiting. Musculoskeletal: Negative. Neurological: Negative for dizziness, tingling and headaches. Psychiatric/Behavioral: Negative for depression. The patient has insomnia. The patient is not nervous/anxious.         Objective:     General: alert, cooperative, no distress   Mental  status: mental status: alert, oriented to person, place, and time, normal mood, behavior, speech, dress, motor activity, and thought processes   Resp: resp: normal effort and no respiratory distress   Neuro: neuro: no gross deficits   Skin: skin: no discoloration or lesions of concern on visible areas     Due to this being a TeleHealth evaluation, many elements of the physical examination are unable to be assessed. MR ABDOMEN W/WO CONTRAST2/22/2021  [facility]  Result Impression   1. 3 left hepatic lesions with arterial enhancement which are very subtle otherwise most compatible with FNH or adenoma. There is no internal fat, hemorrhage, scar or capsule to differentiate the 2. If differentiation between the 2 is desired, could consider follow-up Eovist enhanced study. No suspicious features. 2. The other small hepatic lesions may represent perfusional anomalies or additional benign lesions. No follow-up necessary. 3. Bilateral Bosniak 2 renal cysts.      CBC WITH DIFFERENTIAL AUTO (02/23/2021 4:44 AM EST)  CBC WITH DIFFERENTIAL AUTO (02/23/2021 4:44 AM EST)   Component Value Ref Range Performed At Pathologist Signature   WBC x 10*3 5.0 4.0 - 11.0 K/uL [facility]     RBC x 10^6 3.64 (L) 3.80 - 5.20 M/uL [facility]     HGB 9.6 (L) 11.7 - 15.5 g/dL [facility]     HCT 31.4 (L) 35.1 - 46.5 % [facility]     MCV 86 81 - 99 fL [facility]     MCH 26 26 - 34 pg [facility]     MCHC 31 31 - 36 g/dL [facility]     RDW 15.0 10.0 - 15.5 % [facility]     Platelet 319 149 - 352 K/uL [facility]     MPV 9.6 9.0 - 13.0 fL [facility]     Segmented Neutrophils (Auto) 43 40 - 75 % [facility]     Lymphocytes (Auto) 47 (H) 20 - 45 % [facility]     Monocytes (Auto) 6 3 - 12 % [facility]     Eosinophils (Auto) 3 0 - 6 % [facility]     Basophils (Auto) 1 0 - 2 % [facility]     Absolute Neutrophils (Auto) 2.2 1.8 - 7.7 K/uL [facility]     Absolute Lymphocytes (Auto) 2.3 1.0 - 4.8 K/uL [facility]     Absolute Monocytes (Auto) 0.3 0.1 - 1.0 K/uL [facility]     Absolute Eosinophils (Auto) 0.2 0.0 - 0.5 K/uL [facility]     Absolute Basophils (Auto) 0.1 0.0 - 0.2 K/uL [facility]       BASIC METABOLIC PANEL (99/72/6425 4:44 AM EST)  BASIC METABOLIC PANEL (43/37/4046 4:44 AM EST)   Component Value Ref Range Performed At Pathologist Signature   Potassium 3.7 3.5 - 5.5 mmol/L [facility]     Sodium 137 133 - 145 mmol/L [facility]     Chloride 107 98 - 110 mmol/L [facility]     Glucose 75 70 - 99 mg/dL [facility]     Calcium 8.6 8.4 - 10.5 mg/dL [facility]     BUN 12 6 - 22 mg/dL [facility]     Creatinine 0.6 0.5 - 1.2 mg/dL [facility]     CO2 21 20 - 32 mmol/L [facility]     eGFR  >60.0 >60.0 [facility]     eGFR Non African American >60.0 >60.0 [facility]     Anion Gap 8.6Comment: Anion Gap calculation based on electrolyte reference ranges. 3.0 - 15.0 mmol/L [facility]       HEPATIC FUNCTION PANEL (02/21/2021 12:32 PM EST)  HEPATIC FUNCTION PANEL (02/21/2021 12:32 PM EST)   Component Value Ref Range Performed At [facility]   Albumin 4.2 3.5 - 5.0 g/dL [facility]     Total Protein 7.5 6.4 - 8.3 g/dL [facility]     Globulin 3.3 2.0 - 4.0 g/dL [facility]     A/G Ratio 1.3 1.1 - 2.6 ratio [facility]     Bilirubin Total 0.6 0.2 - 1.2 mg/dL [facility]     Bilirubin Direct <0.2 0.0 - 0.3 mg/dL [facility]     SGOT (AST) 14 10 - 37 U/L [facility]     Alkaline Phosphatase 68 25 - 115 U/L [facility]     SGPT (ALT) 9 5 - 40 U/L [facility]       HEPATIC FUNCTION PANEL (02/21/2021 12:32 PM EST)   Specimen         Assessment & Plan:   1. Peptic ulcer disease  - GI follow-up - information provided to patient  - omeprazole (PRILOSEC) 40 mg capsule; Take 1 Cap by mouth daily. 30 minutes before breakfast  Dispense: 90 Cap; Refill: 1    2. Hematochezia  - GI follow-up    3. Breast pain, right, Nipple discharge  - US BREAST RT COMPLETE 4 QUAD; Future    4. Iron deficiency anemia due to chronic blood loss  - start PO iron  - repeat CBC in 2 months      Follow-up and Dispositions    · Return in about 2 months (around 7/10/2021) for ROV, in-person. We discussed the expected course, resolution and complications of the diagnosis(es) in detail. Medication risks, benefits, costs, interactions, and alternatives were discussed as indicated. I advised her to contact the office if her condition worsens, changes or fails to improve as anticipated. She expressed understanding with the diagnosis(es) and plan. Pursuant to the emergency declaration under the [facility], 1135 waiver authority and the [employer] and [employer] Act, this Virtual  Visit was conducted, with patient's consent, to reduce the patient's risk of exposure to COVID-19 and provide continuity of care for an established patient. Services were provided through a video synchronous discussion virtually to substitute for in-person clinic visit.     Luis Alberto Torre MD

## 2021-06-10 ENCOUNTER — TELEPHONE (OUTPATIENT)
Dept: FAMILY MEDICINE CLINIC | Age: 36
End: 2021-06-10

## 2021-06-10 DIAGNOSIS — N64.52 NIPPLE DISCHARGE: Primary | ICD-10-CM

## 2021-06-10 NOTE — TELEPHONE ENCOUNTER
Imaging at [facility] called to say that patient needs diagnostic bilateral mammo ordered.  They already has the order for the US right breast.thank you

## 2021-09-02 DIAGNOSIS — N64.52 NIPPLE DISCHARGE: ICD-10-CM

## 2022-03-18 PROBLEM — N64.52 NIPPLE DISCHARGE: Status: ACTIVE | Noted: 2021-05-10

## 2022-03-18 PROBLEM — K27.9 PEPTIC ULCER DISEASE: Status: ACTIVE | Noted: 2021-05-10

## 2022-03-19 PROBLEM — K92.1 HEMATOCHEZIA: Status: ACTIVE | Noted: 2021-05-10

## 2022-03-19 PROBLEM — F51.01 PRIMARY INSOMNIA: Status: ACTIVE | Noted: 2018-04-10

## 2022-03-19 PROBLEM — N64.4 BREAST PAIN, RIGHT: Status: ACTIVE | Noted: 2021-05-10

## 2022-03-19 PROBLEM — F43.10 PTSD (POST-TRAUMATIC STRESS DISORDER): Status: ACTIVE | Noted: 2018-04-10

## 2022-03-19 PROBLEM — D50.0 IRON DEFICIENCY ANEMIA DUE TO CHRONIC BLOOD LOSS: Status: ACTIVE | Noted: 2021-05-10

## 2024-10-18 ENCOUNTER — HOSPITAL ENCOUNTER (OUTPATIENT)
Facility: HOSPITAL | Age: 39
Setting detail: SPECIMEN
Discharge: HOME | End: 2024-10-21

## 2024-10-18 ENCOUNTER — OFFICE VISIT (OUTPATIENT)
Facility: CLINIC | Age: 39
End: 2024-10-18

## 2024-10-18 VITALS
DIASTOLIC BLOOD PRESSURE: 92 MMHG | BODY MASS INDEX: 29.57 KG/M2 | SYSTOLIC BLOOD PRESSURE: 139 MMHG | HEIGHT: 66 IN | TEMPERATURE: 97.9 F | OXYGEN SATURATION: 98 % | WEIGHT: 184 LBS | HEART RATE: 88 BPM

## 2024-10-18 DIAGNOSIS — G62.9 NEUROPATHY: ICD-10-CM

## 2024-10-18 DIAGNOSIS — K76.9 HEPATIC LESION: ICD-10-CM

## 2024-10-18 DIAGNOSIS — Z13.89 SCREENING FOR BLOOD OR PROTEIN IN URINE: ICD-10-CM

## 2024-10-18 DIAGNOSIS — Z01.84 IMMUNITY STATUS TESTING: ICD-10-CM

## 2024-10-18 DIAGNOSIS — Z76.89 ENCOUNTER TO ESTABLISH CARE: Primary | ICD-10-CM

## 2024-10-18 DIAGNOSIS — F39 MOOD DISORDER (HCC): ICD-10-CM

## 2024-10-18 DIAGNOSIS — I10 PRIMARY HYPERTENSION: ICD-10-CM

## 2024-10-18 DIAGNOSIS — F41.1 GAD (GENERALIZED ANXIETY DISORDER): ICD-10-CM

## 2024-10-18 DIAGNOSIS — Z13.29 SCREENING FOR THYROID DISORDER: ICD-10-CM

## 2024-10-18 DIAGNOSIS — Z11.4 SCREENING FOR HUMAN IMMUNODEFICIENCY VIRUS WITHOUT PRESENCE OF RISK FACTORS: ICD-10-CM

## 2024-10-18 DIAGNOSIS — Z13.220 SCREENING FOR LIPID DISORDERS: ICD-10-CM

## 2024-10-18 DIAGNOSIS — M54.12 CERVICAL RADICULOPATHY: ICD-10-CM

## 2024-10-18 DIAGNOSIS — E55.9 VITAMIN D DEFICIENCY: ICD-10-CM

## 2024-10-18 DIAGNOSIS — N64.52 BREAST DISCHARGE: ICD-10-CM

## 2024-10-18 DIAGNOSIS — Z13.1 SCREENING FOR DIABETES MELLITUS: ICD-10-CM

## 2024-10-18 DIAGNOSIS — D50.9 IRON DEFICIENCY ANEMIA, UNSPECIFIED IRON DEFICIENCY ANEMIA TYPE: ICD-10-CM

## 2024-10-18 PROBLEM — K27.9 PEPTIC ULCER DISEASE: Status: RESOLVED | Noted: 2021-05-10 | Resolved: 2024-10-18

## 2024-10-18 PROBLEM — D50.0 IRON DEFICIENCY ANEMIA DUE TO CHRONIC BLOOD LOSS: Status: RESOLVED | Noted: 2021-05-10 | Resolved: 2024-10-18

## 2024-10-18 PROBLEM — N64.4 BREAST PAIN, RIGHT: Status: RESOLVED | Noted: 2021-05-10 | Resolved: 2024-10-18

## 2024-10-18 PROBLEM — K92.1 HEMATOCHEZIA: Status: RESOLVED | Noted: 2021-05-10 | Resolved: 2024-10-18

## 2024-10-18 LAB — SENTARA SPECIMEN COLLECTION: NORMAL

## 2024-10-18 PROCEDURE — 99001 SPECIMEN HANDLING PT-LAB: CPT

## 2024-10-18 SDOH — ECONOMIC STABILITY: FOOD INSECURITY: WITHIN THE PAST 12 MONTHS, THE FOOD YOU BOUGHT JUST DIDN'T LAST AND YOU DIDN'T HAVE MONEY TO GET MORE.: NEVER TRUE

## 2024-10-18 SDOH — ECONOMIC STABILITY: FOOD INSECURITY: WITHIN THE PAST 12 MONTHS, YOU WORRIED THAT YOUR FOOD WOULD RUN OUT BEFORE YOU GOT MONEY TO BUY MORE.: NEVER TRUE

## 2024-10-18 SDOH — ECONOMIC STABILITY: INCOME INSECURITY: HOW HARD IS IT FOR YOU TO PAY FOR THE VERY BASICS LIKE FOOD, HOUSING, MEDICAL CARE, AND HEATING?: NOT HARD AT ALL

## 2024-10-18 ASSESSMENT — PATIENT HEALTH QUESTIONNAIRE - PHQ9
SUM OF ALL RESPONSES TO PHQ QUESTIONS 1-9: 0
SUM OF ALL RESPONSES TO PHQ QUESTIONS 1-9: 0
SUM OF ALL RESPONSES TO PHQ9 QUESTIONS 1 & 2: 0
2. FEELING DOWN, DEPRESSED OR HOPELESS: NOT AT ALL
SUM OF ALL RESPONSES TO PHQ QUESTIONS 1-9: 0
1. LITTLE INTEREST OR PLEASURE IN DOING THINGS: NOT AT ALL
SUM OF ALL RESPONSES TO PHQ QUESTIONS 1-9: 0

## 2024-10-18 ASSESSMENT — ANXIETY QUESTIONNAIRES
4. TROUBLE RELAXING: MORE THAN HALF THE DAYS
GAD7 TOTAL SCORE: 14
6. BECOMING EASILY ANNOYED OR IRRITABLE: MORE THAN HALF THE DAYS
3. WORRYING TOO MUCH ABOUT DIFFERENT THINGS: MORE THAN HALF THE DAYS
2. NOT BEING ABLE TO STOP OR CONTROL WORRYING: MORE THAN HALF THE DAYS
5. BEING SO RESTLESS THAT IT IS HARD TO SIT STILL: MORE THAN HALF THE DAYS
IF YOU CHECKED OFF ANY PROBLEMS ON THIS QUESTIONNAIRE, HOW DIFFICULT HAVE THESE PROBLEMS MADE IT FOR YOU TO DO YOUR WORK, TAKE CARE OF THINGS AT HOME, OR GET ALONG WITH OTHER PEOPLE: VERY DIFFICULT
1. FEELING NERVOUS, ANXIOUS, OR ON EDGE: MORE THAN HALF THE DAYS
7. FEELING AFRAID AS IF SOMETHING AWFUL MIGHT HAPPEN: MORE THAN HALF THE DAYS

## 2024-10-18 NOTE — PROGRESS NOTES
Freddie Henley is a 38 y.o. year old female who presents today for   Chief Complaint   Patient presents with    New Patient        \"Have you been to the ER, urgent care clinic since your last visit?  Hospitalized since your last visit?\"   YES - When: approximately 3 months ago.  Where and Why: mini stroke.     “Have you seen or consulted any other health care providers outside our system since your last visit?”   NO      “Have you had a pap smear?”    NO    Date of last Cervical Cancer screen (HPV or PAP): 4/20/2018             - DICK Verma  [facility]  Phone: [phone]  Fax: [phone]

## 2024-10-18 NOTE — PROGRESS NOTES
Patient ID: Freddie Henley is a 38 y.o. female established patient presents for the following:      Subjective:     Primary historian: patient    New Patient       HPI   She presents establish care with new PCP.  She reports occasional left-sided chest pain at rest, denies any dyspnea, no diaphoresis.  States that left chest discomfort is not necessarily associated with anxiety.  She also has concern for clear to cloudy breast discharge.          10/18/2024     9:23 AM 5/10/2021     2:13 PM   PHQ-9    Little interest or pleasure in doing things 0    Little interest or pleasure in doing things  0   Feeling down, depressed, or hopeless 0    PHQ-2 Score 0    Total Score PHQ 2  0   PHQ-9 Total Score 0           10/18/2024     9:23 AM   RANDEE-7 SCREENING   Feeling nervous, anxious, or on edge More than half the days   Not being able to stop or control worrying More than half the days   Worrying too much about different things More than half the days   Trouble relaxing More than half the days   Being so restless that it is hard to sit still More than half the days   Becoming easily annoyed or irritable More than half the days   Feeling afraid as if something awful might happen More than half the days   RANDEE-7 Total Score 14   How difficult have these problems made it for you to do your work, take care of things at home, or get along with other people? Very difficult          Past Medical History:   Diagnosis Date    Migraine with aura and without status migrainosus, not intractable 4/10/2018    PTSD (post-traumatic stress disorder)     Stomach ulcer        No past surgical history on file.    No current outpatient medications on file.     No current facility-administered medications for this visit.          ROS   Review of Systems   Constitutional:  Positive for fatigue. Negative for chills and fever.   HENT:  Negative for ear pain, hearing loss, sore throat and trouble swallowing.    Eyes: Negative.    Respiratory:  Negative

## 2024-11-13 ENCOUNTER — HOSPITAL ENCOUNTER (OUTPATIENT)
Facility: HOSPITAL | Age: 39
Discharge: HOME | End: 2024-11-16

## 2024-11-13 ENCOUNTER — HOSPITAL ENCOUNTER (OUTPATIENT)
Dept: WOMENS IMAGING | Facility: HOSPITAL | Age: 39
Discharge: HOME | End: 2024-11-16

## 2024-11-13 DIAGNOSIS — N64.52 BREAST DISCHARGE: ICD-10-CM

## 2024-11-30 PROBLEM — N64.52 BREAST DISCHARGE: Status: ACTIVE | Noted: 2024-11-30

## 2024-11-30 PROBLEM — D50.9 IRON DEFICIENCY ANEMIA: Status: ACTIVE | Noted: 2021-05-10

## 2025-03-24 ENCOUNTER — OFFICE VISIT (OUTPATIENT)
Facility: CLINIC | Age: 40
End: 2025-03-24
Payer: MEDICAID

## 2025-03-24 VITALS
TEMPERATURE: 97.9 F | WEIGHT: 186.2 LBS | HEIGHT: 66 IN | DIASTOLIC BLOOD PRESSURE: 89 MMHG | HEART RATE: 79 BPM | RESPIRATION RATE: 18 BRPM | OXYGEN SATURATION: 97 % | SYSTOLIC BLOOD PRESSURE: 130 MMHG | BODY MASS INDEX: 29.92 KG/M2

## 2025-03-24 DIAGNOSIS — F41.1 GAD (GENERALIZED ANXIETY DISORDER): ICD-10-CM

## 2025-03-24 DIAGNOSIS — I10 PRIMARY HYPERTENSION: Primary | ICD-10-CM

## 2025-03-24 DIAGNOSIS — M54.50 ACUTE MIDLINE LOW BACK PAIN WITHOUT SCIATICA: ICD-10-CM

## 2025-03-24 DIAGNOSIS — Z13.1 DIABETES MELLITUS SCREENING: ICD-10-CM

## 2025-03-24 DIAGNOSIS — N92.6 ABNORMAL MENSES: ICD-10-CM

## 2025-03-24 DIAGNOSIS — R07.9 CHEST PAIN, UNSPECIFIED TYPE: ICD-10-CM

## 2025-03-24 DIAGNOSIS — F51.01 PRIMARY INSOMNIA: ICD-10-CM

## 2025-03-24 DIAGNOSIS — F43.10 PTSD (POST-TRAUMATIC STRESS DISORDER): ICD-10-CM

## 2025-03-24 LAB — HBA1C MFR BLD: 5.6 %

## 2025-03-24 PROCEDURE — 3075F SYST BP GE 130 - 139MM HG: CPT

## 2025-03-24 PROCEDURE — 3079F DIAST BP 80-89 MM HG: CPT

## 2025-03-24 PROCEDURE — 83036 HEMOGLOBIN GLYCOSYLATED A1C: CPT

## 2025-03-24 PROCEDURE — 93000 ELECTROCARDIOGRAM COMPLETE: CPT

## 2025-03-24 PROCEDURE — 99214 OFFICE O/P EST MOD 30 MIN: CPT

## 2025-03-24 RX ORDER — QUETIAPINE FUMARATE 50 MG/1
50 TABLET, FILM COATED ORAL NIGHTLY
Qty: 90 TABLET | Refills: 0 | Status: SHIPPED | OUTPATIENT
Start: 2025-03-24

## 2025-03-24 RX ORDER — PRAZOSIN HYDROCHLORIDE 1 MG/1
1 CAPSULE ORAL NIGHTLY
Qty: 30 CAPSULE | Refills: 3 | Status: SHIPPED | OUTPATIENT
Start: 2025-03-24

## 2025-03-24 RX ORDER — BUSPIRONE HYDROCHLORIDE 5 MG/1
5 TABLET ORAL 2 TIMES DAILY
Qty: 60 TABLET | Refills: 0 | Status: SHIPPED | OUTPATIENT
Start: 2025-03-24 | End: 2025-04-23

## 2025-03-24 RX ORDER — TRAZODONE HYDROCHLORIDE 50 MG/1
50 TABLET ORAL NIGHTLY PRN
Qty: 30 TABLET | Refills: 5 | Status: SHIPPED | OUTPATIENT
Start: 2025-03-24

## 2025-03-24 RX ORDER — HYDROXYZINE HYDROCHLORIDE 25 MG/1
25 TABLET, FILM COATED ORAL EVERY 8 HOURS PRN
Qty: 90 TABLET | Refills: 1 | Status: SHIPPED | OUTPATIENT
Start: 2025-03-24

## 2025-03-24 SDOH — ECONOMIC STABILITY: FOOD INSECURITY: WITHIN THE PAST 12 MONTHS, THE FOOD YOU BOUGHT JUST DIDN'T LAST AND YOU DIDN'T HAVE MONEY TO GET MORE.: NEVER TRUE

## 2025-03-24 SDOH — ECONOMIC STABILITY: FOOD INSECURITY: WITHIN THE PAST 12 MONTHS, YOU WORRIED THAT YOUR FOOD WOULD RUN OUT BEFORE YOU GOT MONEY TO BUY MORE.: NEVER TRUE

## 2025-03-24 ASSESSMENT — ANXIETY QUESTIONNAIRES
5. BEING SO RESTLESS THAT IT IS HARD TO SIT STILL: NEARLY EVERY DAY
1. FEELING NERVOUS, ANXIOUS, OR ON EDGE: NEARLY EVERY DAY
3. WORRYING TOO MUCH ABOUT DIFFERENT THINGS: NEARLY EVERY DAY
IF YOU CHECKED OFF ANY PROBLEMS ON THIS QUESTIONNAIRE, HOW DIFFICULT HAVE THESE PROBLEMS MADE IT FOR YOU TO DO YOUR WORK, TAKE CARE OF THINGS AT HOME, OR GET ALONG WITH OTHER PEOPLE: VERY DIFFICULT
1. FEELING NERVOUS, ANXIOUS, OR ON EDGE: NEARLY EVERY DAY
GAD7 TOTAL SCORE: 20
2. NOT BEING ABLE TO STOP OR CONTROL WORRYING: NEARLY EVERY DAY
7. FEELING AFRAID AS IF SOMETHING AWFUL MIGHT HAPPEN: MORE THAN HALF THE DAYS
6. BECOMING EASILY ANNOYED OR IRRITABLE: NEARLY EVERY DAY
3. WORRYING TOO MUCH ABOUT DIFFERENT THINGS: NEARLY EVERY DAY
7. FEELING AFRAID AS IF SOMETHING AWFUL MIGHT HAPPEN: MORE THAN HALF THE DAYS
6. BECOMING EASILY ANNOYED OR IRRITABLE: NEARLY EVERY DAY
IF YOU CHECKED OFF ANY PROBLEMS ON THIS QUESTIONNAIRE, HOW DIFFICULT HAVE THESE PROBLEMS MADE IT FOR YOU TO DO YOUR WORK, TAKE CARE OF THINGS AT HOME, OR GET ALONG WITH OTHER PEOPLE: VERY DIFFICULT
2. NOT BEING ABLE TO STOP OR CONTROL WORRYING: NEARLY EVERY DAY
4. TROUBLE RELAXING: NEARLY EVERY DAY
4. TROUBLE RELAXING: NEARLY EVERY DAY
5. BEING SO RESTLESS THAT IT IS HARD TO SIT STILL: NEARLY EVERY DAY

## 2025-03-24 ASSESSMENT — PATIENT HEALTH QUESTIONNAIRE - PHQ9
SUM OF ALL RESPONSES TO PHQ QUESTIONS 1-9: 2
2. FEELING DOWN, DEPRESSED OR HOPELESS: NOT AT ALL
SUM OF ALL RESPONSES TO PHQ QUESTIONS 1-9: 2
SUM OF ALL RESPONSES TO PHQ QUESTIONS 1-9: 2
2. FEELING DOWN, DEPRESSED OR HOPELESS: NOT AT ALL
1. LITTLE INTEREST OR PLEASURE IN DOING THINGS: MORE THAN HALF THE DAYS
1. LITTLE INTEREST OR PLEASURE IN DOING THINGS: MORE THAN HALF THE DAYS
SUM OF ALL RESPONSES TO PHQ QUESTIONS 1-9: 2
SUM OF ALL RESPONSES TO PHQ9 QUESTIONS 1 & 2: 2

## 2025-03-24 NOTE — PROGRESS NOTES
Freddie Henley is a 39 y.o. year old female who presents today for   Chief Complaint   Patient presents with    Anxiety    Insomnia    Discuss Labs    EKG Only       \"Have you been to the ER, urgent care clinic since your last visit?  Hospitalized since your last visit?\"    Er 3/6/25 FOR BACK PAIN    “Have you seen or consulted any other health care providers outside our system since your last visit?”    no     “Have you had a pap smear?”    NO    Date of last Cervical Cancer screen (HPV or PAP): 4/20/2018           Click Here for Release of Records Request    - Letty Cullipher, LPN  [facility]  [facility]  Phone: [phone]  Fax: [phone]

## 2025-03-26 ASSESSMENT — ENCOUNTER SYMPTOMS
COUGH: 0
SORE THROAT: 0
CHEST TIGHTNESS: 0
TROUBLE SWALLOWING: 0
BLOOD IN STOOL: 0
DIARRHEA: 0
EYES NEGATIVE: 1
SHORTNESS OF BREATH: 0
NAUSEA: 0
VOMITING: 0
WHEEZING: 0
ABDOMINAL PAIN: 0
CONSTIPATION: 0

## 2025-03-26 NOTE — PROGRESS NOTES
Patient ID: Freddie Henley is a 39 y.o. female established patient presents for the following:      Subjective:     Primary historian: patient    Anxiety, Insomnia, Discuss Labs, and EKG Only       Anxiety  Symptoms include insomnia and nervous/anxious behavior. Patient reports no chest pain, confusion, decreased concentration, dizziness, nausea, palpitations, shortness of breath or suicidal ideas.       Insomnia  Pertinent negatives include no abdominal pain, chest pain, chills, coughing, fatigue, fever, nausea, numbness, sore throat, vomiting or weakness.          3/24/2025    12:50 PM 10/18/2024     9:23 AM 5/10/2021     2:13 PM   PHQ-9    Little interest or pleasure in doing things 2 0    Little interest or pleasure in doing things   0   Feeling down, depressed, or hopeless 0 0    PHQ-2 Score 2  0    Total Score PHQ 2   0   PHQ-9 Total Score 2  0        Patient-reported          3/24/2025    12:46 PM 10/18/2024     9:23 AM   RANDEE-7 SCREENING   Feeling nervous, anxious, or on edge Nearly every day More than half the days   Not being able to stop or control worrying Nearly every day More than half the days   Worrying too much about different things Nearly every day More than half the days   Trouble relaxing Nearly every day More than half the days   Being so restless that it is hard to sit still Nearly every day More than half the days   Becoming easily annoyed or irritable Nearly every day More than half the days   Feeling afraid as if something awful might happen More than half the days More than half the days   RANDEE-7 Total Score 20  14   How difficult have these problems made it for you to do your work, take care of things at home, or get along with other people? Very difficult Very difficult       Patient-reported          Past Medical History:   Diagnosis Date    Migraine with aura and without status migrainosus, not intractable 4/10/2018    PTSD (post-traumatic stress disorder)     Stomach ulcer        History

## 2025-05-01 DIAGNOSIS — F41.1 GAD (GENERALIZED ANXIETY DISORDER): ICD-10-CM

## 2025-05-02 RX ORDER — BUSPIRONE HYDROCHLORIDE 5 MG/1
5 TABLET ORAL 2 TIMES DAILY
Qty: 60 TABLET | Refills: 0 | OUTPATIENT
Start: 2025-05-02

## 2025-05-20 DIAGNOSIS — F51.01 PRIMARY INSOMNIA: ICD-10-CM

## 2025-05-20 DIAGNOSIS — F41.1 GAD (GENERALIZED ANXIETY DISORDER): ICD-10-CM

## 2025-05-20 NOTE — TELEPHONE ENCOUNTER
Medication(s) requesting:   Requested Prescriptions     Pending Prescriptions Disp Refills    QUEtiapine (SEROQUEL) 50 MG tablet 90 tablet 0     Sig: Take 1 tablet by mouth nightly       Last office visit:  3/24/2025  Next office visit DMA: Visit date not found

## 2025-05-21 RX ORDER — QUETIAPINE FUMARATE 50 MG/1
50 TABLET, FILM COATED ORAL NIGHTLY
Qty: 90 TABLET | Refills: 0 | Status: SHIPPED | OUTPATIENT
Start: 2025-05-21

## 2025-09-03 DIAGNOSIS — F51.01 PRIMARY INSOMNIA: ICD-10-CM

## 2025-09-03 DIAGNOSIS — F41.1 GAD (GENERALIZED ANXIETY DISORDER): ICD-10-CM

## 2025-09-07 RX ORDER — QUETIAPINE FUMARATE 50 MG/1
50 TABLET, FILM COATED ORAL NIGHTLY
Qty: 90 TABLET | Refills: 0 | Status: SHIPPED | OUTPATIENT
Start: 2025-09-07